# Patient Record
Sex: FEMALE | Race: WHITE | NOT HISPANIC OR LATINO
[De-identification: names, ages, dates, MRNs, and addresses within clinical notes are randomized per-mention and may not be internally consistent; named-entity substitution may affect disease eponyms.]

---

## 2021-11-19 PROBLEM — Z00.00 ENCOUNTER FOR PREVENTIVE HEALTH EXAMINATION: Status: ACTIVE | Noted: 2021-11-19

## 2021-11-27 NOTE — PHYSICAL EXAM
[General Appearance - Alert] : alert [General Appearance - In No Acute Distress] : in no acute distress [Person] : oriented to person [Current Events] : inadequate knowledge of current events [Motor Strength] : muscle strength was normal in all four extremities [Sensation Tactile Decrease] : light touch was intact [FreeTextEntry5] : R quadrantanopia

## 2021-11-27 NOTE — HISTORY OF PRESENT ILLNESS
[de-identified] : OLIVER MONK is a 76 year female with a PMH of HTN, HLD, Hypothyroidism, CHF, Afib on Eliquis, +PPM never smoker and recent pelvic fracture with hospitalization in early November 2021 at [facility], discharged to [facility] who presents to the office today for neuroendovascular consultation due to recent hospitalization at [facility] from 11/16/21-11/ /21 for left PCA infarct, found during work up of incoherent speech, right quadrantanopia and confusion for >24 hours, out of the window for TPA. 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 Eliquis was increased from 2.5mg bid to 5mg bid and she remained on a statin.  LDL 38. She was discharged back to [facility] on 11/18/21.  \par \par

## 2021-12-03 ENCOUNTER — NON-APPOINTMENT (OUTPATIENT)
Age: 76
End: 2021-12-03

## 2021-12-03 ENCOUNTER — APPOINTMENT (OUTPATIENT)
Dept: NEUROSURGERY | Facility: CLINIC | Age: 76
End: 2021-12-03
Payer: MEDICARE

## 2021-12-03 VITALS
DIASTOLIC BLOOD PRESSURE: 60 MMHG | SYSTOLIC BLOOD PRESSURE: 106 MMHG | WEIGHT: 119 LBS | HEART RATE: 68 BPM | BODY MASS INDEX: 18.68 KG/M2 | TEMPERATURE: 97.5 F | HEIGHT: 67 IN

## 2021-12-03 PROCEDURE — 99205 OFFICE O/P NEW HI 60 MIN: CPT

## 2021-12-03 NOTE — REASON FOR VISIT
[Follow-Up: _____] : a [unfilled] follow-up visit [FreeTextEntry1] : OLIVER MONK is a 76 year female with a PMH of HTN, HLD, Hypothyroidism, CHF, Afib on Eliquis, +PPM never smoker and recent pelvic fracture with hospitalization in early November 2021 at [facility], discharged to [facility] who presents to the office today for neuroendovascular consultation due to recent hospitalization at [facility] from 11/16/21-11/ /21 for left PCA infarct, found during work up of incoherent speech, right quadrantanopia and confusion for >24 hours, out of the window for TPA. 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 Eliquis was increased from 2.5mg bid to 5mg bid and she remained on a statin. LDL 38. She was discharged back to [facility] on 11/18/21. \par Of note, her niece has history of cerebral aneurysm s/p surgery.\par \par Today she has no complaints, feels that her symptoms have largely resolved other than her right quadrantanopia that remains.  \par

## 2021-12-03 NOTE — ASSESSMENT
[FreeTextEntry1] : Ms. Barrios is a 76 y.o. female with PMH HTN, HLD, Hypothyroidism, CHF, Afib on Eliquis, +PPM never smoker and recent pelvic fracture November 2021, s/p recent hospitalization at [facility] from 11/16/21-11/ /21 for left PCA infarct, found to have incidental Left PComm aneurysm on CTA head. \par \par I have discussed the natural history and treatment options for Pcomm aneurysms with the patient. I explained the indications for observation and imaging surveillance, medical management endovascular therapies and surgery. I discussed the risks, benefits, possible complications and expected outcome related to each treatment option. In the end, given that the patient cannot get an MRA due to incompatible PPM, II recommend follow up surveillance CTA head in1 year's time to evaluate for growth or change in morphology of the aneurysm.. The patient was instructed that if she experiences new or worsening headaches, worst headache of her life, nausea/vomiting, visual changes, new weakness or numbness of extremities, she should go immediately to the emergency department or call 911. The patient demonstrates understanding of the above.\par The patient understands the plan of care and is in agreement. All questions answered to patient satisfaction.\par \par She should follow up with stroke neurology in [place] near home for her recent PCA infarct. \par

## 2022-11-17 DIAGNOSIS — I67.1 CEREBRAL ANEURYSM, NONRUPTURED: ICD-10-CM

## 2022-11-17 DIAGNOSIS — Z01.812 ENCOUNTER FOR PREPROCEDURAL LABORATORY EXAMINATION: ICD-10-CM
